# Patient Record
Sex: FEMALE | Race: WHITE | NOT HISPANIC OR LATINO
[De-identification: names, ages, dates, MRNs, and addresses within clinical notes are randomized per-mention and may not be internally consistent; named-entity substitution may affect disease eponyms.]

---

## 2023-03-03 ENCOUNTER — APPOINTMENT (OUTPATIENT)
Dept: ORTHOPEDIC SURGERY | Facility: CLINIC | Age: 63
End: 2023-03-03
Payer: COMMERCIAL

## 2023-03-03 VITALS — BODY MASS INDEX: 34.96 KG/M2 | HEIGHT: 62 IN | WEIGHT: 190 LBS

## 2023-03-03 DIAGNOSIS — I10 ESSENTIAL (PRIMARY) HYPERTENSION: ICD-10-CM

## 2023-03-03 DIAGNOSIS — E11.9 TYPE 2 DIABETES MELLITUS W/OUT COMPLICATIONS: ICD-10-CM

## 2023-03-03 PROBLEM — Z00.00 ENCOUNTER FOR PREVENTIVE HEALTH EXAMINATION: Status: ACTIVE | Noted: 2023-03-03

## 2023-03-03 PROCEDURE — 72110 X-RAY EXAM L-2 SPINE 4/>VWS: CPT

## 2023-03-03 PROCEDURE — 99204 OFFICE O/P NEW MOD 45 MIN: CPT | Mod: 25

## 2023-03-03 PROCEDURE — 20552 NJX 1/MLT TRIGGER POINT 1/2: CPT

## 2023-03-03 RX ORDER — METFORMIN HYDROCHLORIDE 625 MG/1
TABLET ORAL
Refills: 0 | Status: ACTIVE | COMMUNITY

## 2023-03-03 RX ORDER — CYCLOBENZAPRINE HYDROCHLORIDE 5 MG/1
5 TABLET, FILM COATED ORAL 3 TIMES DAILY
Qty: 30 | Refills: 0 | Status: ACTIVE | COMMUNITY
Start: 2023-03-03 | End: 1900-01-01

## 2023-03-03 RX ORDER — ATORVASTATIN CALCIUM 80 MG/1
TABLET, FILM COATED ORAL
Refills: 0 | Status: ACTIVE | COMMUNITY

## 2023-03-03 RX ORDER — LISINOPRIL 30 MG/1
TABLET ORAL
Refills: 0 | Status: ACTIVE | COMMUNITY

## 2023-03-03 RX ORDER — DULAGLUTIDE 4.5 MG/.5ML
INJECTION, SOLUTION SUBCUTANEOUS
Refills: 0 | Status: ACTIVE | COMMUNITY

## 2023-03-03 RX ORDER — OMEPRAZOLE 10 MG/1
10 CAPSULE, DELAYED RELEASE ORAL
Refills: 0 | Status: ACTIVE | COMMUNITY

## 2023-03-03 NOTE — PROCEDURE
[Trigger point 1-2 muscle groups] : trigger point 1-2 muscle groups [Bilateral] : bilaterally of the [Gluteus Jesu muscle] : gluteus jesu muscle [Pain] : pain [Alcohol] : alcohol [Betadine] : betadine [Ethyl Chloride sprayed topically] : ethyl chloride sprayed topically [Sterile technique used] : sterile technique used [___ cc    1%] : Lidocaine ~Vcc of 1%  [___ cc    40mg] : Triamcinolone (Kenalog) ~Vcc of 40 mg  [] : Patient tolerated procedure well [Risks, benefits, alternatives discussed / Verbal consent obtained] : the risks benefits, and alternatives have been discussed, and verbal consent was obtained

## 2023-03-03 NOTE — ASSESSMENT
[FreeTextEntry1] : 63 F with low back paina nd muscle spasm\par TPI today bilateral glutes\par nsaids\par I advised ALINE that the NSAID should be taken with food.  In addition while taking the prescribed NSAID, no over the counter or other NSAIDs should be used, such as ibuprofen (Motrin or Advil) or naproxen (Aleve) as this can cause stomach upset or other side effects.  If needed for fever or breakthrough pain Tylenol can be used.\par  We will also prescribe Muscle Relaxants as needed.  Discussed side effects including but not limited to drowsiness and dizziness.  Discussed patient should not drive after taking the medicine.\par FU 4 weeks\par PT \par If pain persists would consider MDP.  Not ideal given active UTI

## 2023-03-03 NOTE — IMAGING
[de-identified] : Spine:\par Inspection/Palpation:\par No tenderness to palpation throughout Cervical/thoracic/lumbar spine.  TTP ove rbilatearl buttocks \par No bony stepoffs, No lesions.\par \par Gait:\par antalgic, unable to perform bilateral toe and heel rise.  unAble to perform tandem gait.  \par \par Range of Motion:\par \par Neurologic:\par \par Bilateral Lower Extremities 5/5 Iliopsoas/Quadriceps/Hamstrings/ Tibialis Anterior/ Gastrocnemius. Extensor Hallucis Longus/ Flexor Hallucis Longus except \par \par \par Sensation intact to light touch L2-S1\par \par Patellar/ Achilles reflex within normal limits.\par \par \par No pain with IR/ER/Flexion of HIps bilaterally \par \par X-ray Ap/Lateral/Flexion/Extension of lumbar spine were viewed and interpreted.  L4-5 spondylolisthesis. No fractures .

## 2023-03-03 NOTE — HISTORY OF PRESENT ILLNESS
[Lower back] : lower back [Sudden] : sudden [10] : 10 [Burning] : burning [Shooting] : shooting [Constant] : constant [Household chores] : household chores [Leisure] : leisure [Meds] : meds [Nothing helps with pain getting better] : Nothing helps with pain getting better [de-identified] : 3/3/23 - This is a 64 y/o F who presents for lower back pain for four days with no specific injury. No n/t. No hx of sx to the lower back. Pt currently taking Tylenol, Cipro. \par \par Was initial thought to be kidney infection.  Had positive urinalysis.  [facility] did ultrasound no kidney infxn.\par \par NO pain raidating down legs. NO numbness ,weakness or tingling.  Burning sensation in back to bilateral flanks and radiating up spine. \par \par hx of previous back issues treated y total orthopedics ? hospitalization.  [] : Post Surgical Visit: no [FreeTextEntry5] : No injury [FreeTextEntry7] : up ti mid back [de-identified] : activity [de-identified] : N/A

## 2023-03-08 RX ORDER — METHYLPREDNISOLONE 4 MG/1
4 TABLET ORAL
Qty: 1 | Refills: 0 | Status: ACTIVE | COMMUNITY
Start: 2023-03-08 | End: 1900-01-01

## 2023-03-13 ENCOUNTER — NON-APPOINTMENT (OUTPATIENT)
Age: 63
End: 2023-03-13

## 2023-04-07 ENCOUNTER — NON-APPOINTMENT (OUTPATIENT)
Age: 63
End: 2023-04-07

## 2023-04-07 ENCOUNTER — APPOINTMENT (OUTPATIENT)
Dept: ORTHOPEDIC SURGERY | Facility: CLINIC | Age: 63
End: 2023-04-07
Payer: COMMERCIAL

## 2023-04-07 VITALS — BODY MASS INDEX: 34.96 KG/M2 | WEIGHT: 190 LBS | HEIGHT: 62 IN

## 2023-04-07 PROCEDURE — 20552 NJX 1/MLT TRIGGER POINT 1/2: CPT

## 2023-04-07 PROCEDURE — 99213 OFFICE O/P EST LOW 20 MIN: CPT | Mod: 25

## 2023-04-07 NOTE — PROCEDURE
[Trigger point 1-2 muscle groups] : trigger point 1-2 muscle groups [Left] : of the left [Gluteus Jesu muscle] : gluteus jesu muscle [Pain] : pain [Alcohol] : alcohol [Betadine] : betadine [Ethyl Chloride sprayed topically] : ethyl chloride sprayed topically [Sterile technique used] : sterile technique used [___ cc    1%] : Lidocaine ~Vcc of 1%  [___ cc    40mg] : Triamcinolone (Kenalog) ~Vcc of 40 mg  [] : Patient tolerated procedure well [Risks, benefits, alternatives discussed / Verbal consent obtained] : the risks benefits, and alternatives have been discussed, and verbal consent was obtained

## 2023-04-07 NOTE — HISTORY OF PRESENT ILLNESS
[Lower back] : lower back [Sudden] : sudden [Burning] : burning [Shooting] : shooting [Constant] : constant [Household chores] : household chores [Leisure] : leisure [Meds] : meds [Nothing helps with pain getting better] : Nothing helps with pain getting better [10] : 10 [de-identified] : 3/3/23 - This is a 64 y/o F who presents for lower back pain for four days with no specific injury. No n/t. No hx of sx to the lower back. Pt currently taking Tylenol, Cipro. \par \par Was initial thought to be kidney infection.  Had positive urinalysis.  [facility] did ultrasound no kidney infxn.\par \par NO pain raidating down legs. NO numbness ,weakness or tingling.  Burning sensation in back to bilateral flanks and radiating up spine. \par \par hx of previous back issues treated y total orthopedics ? hospitalization. \par \par 4/7/23 pain is improved but with persistent spasms on left to middle. No right sided symptoms nay longer. No pain radiating down legs. Has been gogin to PT and chiro still.  Started on new bladder medicaiton. [] : Post Surgical Visit: no [FreeTextEntry5] : No injury [FreeTextEntry7] : up ti mid back [de-identified] : activity [de-identified] : N/A

## 2023-04-07 NOTE — ASSESSMENT
[FreeTextEntry1] : 63 F with low back paina nd muscle spasm improved on right but still some residual pain on left. \par TPI today Left glute \par nsaids\par I advised ALINE that the NSAID should be taken with food.  In addition while taking the prescribed NSAID, no over the counter or other NSAIDs should be used, such as ibuprofen (Motrin or Advil) or naproxen (Aleve) as this can cause stomach upset or other side effects.  If needed for fever or breakthrough pain Tylenol can be used.\par  We will also prescribe Muscle Relaxants as needed.  Discussed side effects including but not limited to drowsiness and dizziness.  Discussed patient should not drive after taking the medicine.\par c/w PT\par MRI L spine \par FU after MRI \par C/w remote work until next visit\par Difficulty with commute secondary to increased pain when sitting for extended periods of time.

## 2023-04-07 NOTE — IMAGING
[de-identified] : Spine:\par Inspection/Palpation:\par No tenderness to palpation throughout Cervical/thoracic/lumbar spine.  TTP over L buttocks /PSIS \par No bony stepoffs, No lesions.\par \par Gait:\par antalgic, unable to perform bilateral toe and heel rise.  unAble to perform tandem gait.  \par \par Range of Motion:\par \par Neurologic:\par \par Bilateral Lower Extremities 5/5 Iliopsoas/Quadriceps/Hamstrings/ Tibialis Anterior/ Gastrocnemius. Extensor Hallucis Longus/ Flexor Hallucis Longus except \par \par \par Sensation intact to light touch L2-S1\par \par Patellar/ Achilles reflex within normal limits.\par \par \par No pain with IR/ER/Flexion of HIps bilaterally \par \par X-ray Ap/Lateral/Flexion/Extension of lumbar spine were viewed and interpreted.  L4-5 spondylolisthesis. No fractures .

## 2023-04-09 ENCOUNTER — FORM ENCOUNTER (OUTPATIENT)
Age: 63
End: 2023-04-09

## 2023-04-10 ENCOUNTER — APPOINTMENT (OUTPATIENT)
Dept: MRI IMAGING | Facility: CLINIC | Age: 63
End: 2023-04-10
Payer: COMMERCIAL

## 2023-04-10 PROCEDURE — 72148 MRI LUMBAR SPINE W/O DYE: CPT

## 2023-04-19 ENCOUNTER — NON-APPOINTMENT (OUTPATIENT)
Age: 63
End: 2023-04-19

## 2023-04-19 ENCOUNTER — APPOINTMENT (OUTPATIENT)
Dept: ORTHOPEDIC SURGERY | Facility: CLINIC | Age: 63
End: 2023-04-19
Payer: COMMERCIAL

## 2023-04-19 PROCEDURE — 99213 OFFICE O/P EST LOW 20 MIN: CPT

## 2023-04-19 NOTE — HISTORY OF PRESENT ILLNESS
[Lower back] : lower back [Sudden] : sudden [Constant] : constant [Household chores] : household chores [Leisure] : leisure [Meds] : meds [Nothing helps with pain getting better] : Nothing helps with pain getting better [7] : 7 [de-identified] : 3/3/23 - This is a 64 y/o F who presents for lower back pain for four days with no specific injury. No n/t. No hx of sx to the lower back. Pt currently taking Tylenol, Cipro. \par \par Was initial thought to be kidney infection.  Had positive urinalysis.  [facility] did ultrasound no kidney infxn.\par \par NO pain raidating down legs. NO numbness ,weakness or tingling.  Burning sensation in back to bilateral flanks and radiating up spine. \par \par hx of previous back issues treated y total orthopedics ? hospitalization. \par \par 4/7/23 pain is improved but with persistent spasms on left to middle. No right sided symptoms nay longer. No pain radiating down legs. Has been gogin to PT and chiro still.  Started on new bladder medicaiton.\par \par 4/19/23: pt reports improvement on left lower back with injection. continuing PT and remote work. Has some right sided pain now.  [] : Post Surgical Visit: no [FreeTextEntry5] : No injury [FreeTextEntry6] : pressure [FreeTextEntry7] : up to mid back [de-identified] : activity [de-identified] : MRI

## 2023-04-19 NOTE — ASSESSMENT
[FreeTextEntry1] : 63 F with low back pain and muscle spasm worse on right. \par nsaids\par I advised ALINE that the NSAID should be taken with food.  In addition while taking the prescribed NSAID, no over the counter or other NSAIDs should be used, such as ibuprofen (Motrin or Advil) or naproxen (Aleve) as this can cause stomach upset or other side effects.  If needed for fever or breakthrough pain Tylenol can be used.\par  We will also prescribe Muscle Relaxants as needed.  Discussed side effects including but not limited to drowsiness and dizziness.  Discussed patient should not drive after taking the medicine.\par c/w PT\par C/w remote work until next visit\par Difficulty with commute secondary to increased pain when sitting for extended periods of time. \par \par Pain management referral for possible facet injection vs MBBB

## 2023-04-19 NOTE — IMAGING
[de-identified] : Spine:\par Inspection/Palpation:\par No tenderness to palpation throughout Cervical/thoracic/lumbar spine.  \par No bony stepoffs, No lesions.\par \par Gait:\par antalgic, unable to perform bilateral toe and heel rise.  unAble to perform tandem gait.  \par \par Range of Motion:\par \par Neurologic:\par \par Bilateral Lower Extremities 5/5 Iliopsoas/Quadriceps/Hamstrings/ Tibialis Anterior/ Gastrocnemius. Extensor Hallucis Longus/ Flexor Hallucis Longus except \par \par \par Sensation intact to light touch L2-S1\par \par Patellar/ Achilles reflex within normal limits.\par \par \par No pain with IR/ER/Flexion of HIps bilaterally \par \par X-ray Ap/Lateral/Flexion/Extension of lumbar spine were viewed and interpreted.  L4-5 spondylolisthesis. No fractures .\par \par MRI Lumbar spine reviewed and interpreted independently.  Agree with report as follows. \par \par 1. Multilevel loss of disc signal and height with multilevel Schmorl's nodes and Modic type 2 endplate change \par at anterior superior L2 and L3.\par 2. T12-L1: Bulge.\par 3. L1-L2: Minor retrolisthesis. Bulge, facet hypertrophy, and ligamentum flavum hypertrophy. Broad central \par herniation.\par 4. L2-L3: Facet hypertrophy, ligamentum flavum hypertrophy, and facet effusion.\par 5. L3-L4: Facet hypertrophy, ligamentum flavum hypertrophy, and right facet effusion.\par 6. L4-L5: Grade 1 anterior spondylolisthesis. Broad bulge, facet hypertrophy, and ligamentum flavum \par hypertrophy with left facet effusion. 5 mm synovial cyst contiguous with the posterior right facet joint. Adjacent \par diffuse soft tissue edema on the right and left. Mild central stenosis.\par 7. L5-S1: Facet hypertrophy and ligamentum flavum hypertrophy with left facet effusion and soft tissue edema.

## 2023-04-27 ENCOUNTER — APPOINTMENT (OUTPATIENT)
Dept: PAIN MANAGEMENT | Facility: CLINIC | Age: 63
End: 2023-04-27
Payer: COMMERCIAL

## 2023-04-27 VITALS — BODY MASS INDEX: 34.96 KG/M2 | HEIGHT: 62 IN | WEIGHT: 190 LBS

## 2023-04-27 DIAGNOSIS — M79.10 MYALGIA, UNSPECIFIED SITE: ICD-10-CM

## 2023-04-27 PROCEDURE — 99204 OFFICE O/P NEW MOD 45 MIN: CPT | Mod: 25

## 2023-04-27 PROCEDURE — 20552 NJX 1/MLT TRIGGER POINT 1/2: CPT

## 2023-04-27 PROCEDURE — J3490M: CUSTOM

## 2023-04-27 RX ORDER — METHOCARBAMOL 750 MG/1
750 TABLET, FILM COATED ORAL TWICE DAILY
Qty: 60 | Refills: 0 | Status: ACTIVE | COMMUNITY
Start: 2023-04-27 | End: 1900-01-01

## 2023-04-27 NOTE — HISTORY OF PRESENT ILLNESS
[Lower back] : lower back [0] : 0 [Household chores] : household chores [FreeTextEntry1] : Initial HPI 04/27/23: \par Pain started March 2023 and is on the BILATERAL lower back described as a pressure/spasm pain. No radiation down the legs. Saw Dr. Partha Curiel who recommended facet injections/MBBs. Pain has been getting better. \par \par MRI Lumbar Spine 4/10/23 independently reviewed: L4-5 spondy, mild central stenosis, right facet cyst \par Conservative Care: Has been doing PT and chiropractor which is helping a lot. \par Pain Medications: Mobic PRN \par Past Injections: TPIs \par Spine surgery: none \par Blood thinners: none\par  [] : This patient has had an injection before: no [FreeTextEntry6] : spasms  [FreeTextEntry9] : lidocaine shots  [de-identified] : L MRI  - - -

## 2023-04-27 NOTE — PHYSICAL EXAM
[de-identified] : Constitutional; Appears well, no apparent distress\par Ability to communicate: Normal \par Respiratory: non-labored breathing\par Skin: No rash noted\par Head: Normocephalic, atraumatic\par Neck: no visible thyroid enlargement\par Eyes: Extraocular movements intact\par Neurologic: Alert and oriented x3\par Psychiatric: normal mood, affect and behavior [] : non-antalgic

## 2023-04-27 NOTE — DISCUSSION/SUMMARY
[de-identified] : After discussing various treatment options with the patient including but not limited to oral medications, physical therapy, exercise modalities as well as interventional spinal injections, we have decided with the following plan:\par \par - Continue home exercises, stretching, activity modification, physical therapy, and conservative care.\par - MRI report and/or images was reviewed and discussed with the patient.\par - Follow-up as needed.\par - Can consider Lumbar MBBs if pain worsens or possible cyst rupture. \par - Will prescribe Methocarbamol (Robaxin) 750mg TID PRN for muscle spasms and to assist with pain relief.\par

## 2023-05-24 ENCOUNTER — NON-APPOINTMENT (OUTPATIENT)
Age: 63
End: 2023-05-24

## 2023-05-24 ENCOUNTER — APPOINTMENT (OUTPATIENT)
Dept: ORTHOPEDIC SURGERY | Facility: CLINIC | Age: 63
End: 2023-05-24
Payer: COMMERCIAL

## 2023-05-24 VITALS — HEIGHT: 62 IN | BODY MASS INDEX: 34.04 KG/M2 | WEIGHT: 185 LBS

## 2023-05-24 VITALS — WEIGHT: 190 LBS | BODY MASS INDEX: 34.96 KG/M2 | HEIGHT: 62 IN

## 2023-05-24 DIAGNOSIS — M71.38 OTHER BURSAL CYST, OTHER SITE: ICD-10-CM

## 2023-05-24 DIAGNOSIS — M43.16 SPONDYLOLISTHESIS, LUMBAR REGION: ICD-10-CM

## 2023-05-24 DIAGNOSIS — M62.838 OTHER MUSCLE SPASM: ICD-10-CM

## 2023-05-24 PROCEDURE — 99213 OFFICE O/P EST LOW 20 MIN: CPT

## 2023-05-24 NOTE — HISTORY OF PRESENT ILLNESS
[Lower back] : lower back [Sudden] : sudden [7] : 7 [Household chores] : household chores [Leisure] : leisure [Meds] : meds [Nothing helps with pain getting better] : Nothing helps with pain getting better [Occasional] : occasional [de-identified] : 5/24/23: continuing PT, NSAIDs. [] : Post Surgical Visit: no [FreeTextEntry5] : No injury, reports that she has no pain. pain has been subsiding after PT [FreeTextEntry6] : slight soreness [FreeTextEntry7] : up to mid back [de-identified] : activity

## 2023-05-24 NOTE — IMAGING
[de-identified] : Spine:\par Inspection/Palpation:\par No tenderness to palpation throughout Cervical/thoracic/lumbar spine.  \par No bony stepoffs, No lesions.\par \par Gait:\par antalgic, unable to perform bilateral toe and heel rise.  unAble to perform tandem gait.  \par \par Range of Motion:\par \par Neurologic:\par \par Bilateral Lower Extremities 5/5 Iliopsoas/Quadriceps/Hamstrings/ Tibialis Anterior/ Gastrocnemius. Extensor Hallucis Longus/ Flexor Hallucis Longus except \par \par \par Sensation intact to light touch L2-S1\par \par Patellar/ Achilles reflex within normal limits.\par \par \par No pain with IR/ER/Flexion of HIps bilaterally \par \par X-ray Ap/Lateral/Flexion/Extension of lumbar spine were viewed and interpreted.  L4-5 spondylolisthesis. No fractures .\par \par MRI Lumbar spine reviewed and interpreted independently.  Agree with report as follows. \par \par 1. Multilevel loss of disc signal and height with multilevel Schmorl's nodes and Modic type 2 endplate change \par at anterior superior L2 and L3.\par 2. T12-L1: Bulge.\par 3. L1-L2: Minor retrolisthesis. Bulge, facet hypertrophy, and ligamentum flavum hypertrophy. Broad central \par herniation.\par 4. L2-L3: Facet hypertrophy, ligamentum flavum hypertrophy, and facet effusion.\par 5. L3-L4: Facet hypertrophy, ligamentum flavum hypertrophy, and right facet effusion.\par 6. L4-L5: Grade 1 anterior spondylolisthesis. Broad bulge, facet hypertrophy, and ligamentum flavum \par hypertrophy with left facet effusion. 5 mm synovial cyst contiguous with the posterior right facet joint. Adjacent \par diffuse soft tissue edema on the right and left. Mild central stenosis.\par 7. L5-S1: Facet hypertrophy and ligamentum flavum hypertrophy with left facet effusion and soft tissue edema.

## 2023-05-24 NOTE — HISTORY OF PRESENT ILLNESS
[Lower back] : lower back [Sudden] : sudden [7] : 7 [Household chores] : household chores [Leisure] : leisure [Meds] : meds [Nothing helps with pain getting better] : Nothing helps with pain getting better [Occasional] : occasional [de-identified] : 5/24/23: continuing PT, NSAIDs. [] : Post Surgical Visit: no [FreeTextEntry5] : No injury, reports that she has no pain. pain has been subsiding after PT [FreeTextEntry6] : slight soreness [FreeTextEntry7] : up to mid back [de-identified] : activity

## 2023-05-24 NOTE — IMAGING
[de-identified] : Spine:\par Inspection/Palpation:\par No tenderness to palpation throughout Cervical/thoracic/lumbar spine.  \par No bony stepoffs, No lesions.\par \par Gait:\par antalgic, unable to perform bilateral toe and heel rise.  unAble to perform tandem gait.  \par \par Range of Motion:\par \par Neurologic:\par \par Bilateral Lower Extremities 5/5 Iliopsoas/Quadriceps/Hamstrings/ Tibialis Anterior/ Gastrocnemius. Extensor Hallucis Longus/ Flexor Hallucis Longus except \par \par \par Sensation intact to light touch L2-S1\par \par Patellar/ Achilles reflex within normal limits.\par \par \par No pain with IR/ER/Flexion of HIps bilaterally \par \par X-ray Ap/Lateral/Flexion/Extension of lumbar spine were viewed and interpreted.  L4-5 spondylolisthesis. No fractures .\par \par MRI Lumbar spine reviewed and interpreted independently.  Agree with report as follows. \par \par 1. Multilevel loss of disc signal and height with multilevel Schmorl's nodes and Modic type 2 endplate change \par at anterior superior L2 and L3.\par 2. T12-L1: Bulge.\par 3. L1-L2: Minor retrolisthesis. Bulge, facet hypertrophy, and ligamentum flavum hypertrophy. Broad central \par herniation.\par 4. L2-L3: Facet hypertrophy, ligamentum flavum hypertrophy, and facet effusion.\par 5. L3-L4: Facet hypertrophy, ligamentum flavum hypertrophy, and right facet effusion.\par 6. L4-L5: Grade 1 anterior spondylolisthesis. Broad bulge, facet hypertrophy, and ligamentum flavum \par hypertrophy with left facet effusion. 5 mm synovial cyst contiguous with the posterior right facet joint. Adjacent \par diffuse soft tissue edema on the right and left. Mild central stenosis.\par 7. L5-S1: Facet hypertrophy and ligamentum flavum hypertrophy with left facet effusion and soft tissue edema.

## 2023-05-24 NOTE — HISTORY OF PRESENT ILLNESS
[Lower back] : lower back [Sudden] : sudden [7] : 7 [Household chores] : household chores [Leisure] : leisure [Meds] : meds [Nothing helps with pain getting better] : Nothing helps with pain getting better [Occasional] : occasional [de-identified] : 5/24/23: continuing PT, NSAIDs. [] : Post Surgical Visit: no [FreeTextEntry5] : No injury, reports that she has no pain. pain has been subsiding after PT [FreeTextEntry6] : slight soreness [FreeTextEntry7] : up to mid back [de-identified] : activity

## 2023-05-24 NOTE — IMAGING
[de-identified] : Spine:\par Inspection/Palpation:\par No tenderness to palpation throughout Cervical/thoracic/lumbar spine.  \par No bony stepoffs, No lesions.\par \par Gait:\par antalgic, unable to perform bilateral toe and heel rise.  unAble to perform tandem gait.  \par \par Range of Motion:\par \par Neurologic:\par \par Bilateral Lower Extremities 5/5 Iliopsoas/Quadriceps/Hamstrings/ Tibialis Anterior/ Gastrocnemius. Extensor Hallucis Longus/ Flexor Hallucis Longus except \par \par \par Sensation intact to light touch L2-S1\par \par Patellar/ Achilles reflex within normal limits.\par \par \par No pain with IR/ER/Flexion of HIps bilaterally \par \par X-ray Ap/Lateral/Flexion/Extension of lumbar spine were viewed and interpreted.  L4-5 spondylolisthesis. No fractures .\par \par MRI Lumbar spine reviewed and interpreted independently.  Agree with report as follows. \par \par 1. Multilevel loss of disc signal and height with multilevel Schmorl's nodes and Modic type 2 endplate change \par at anterior superior L2 and L3.\par 2. T12-L1: Bulge.\par 3. L1-L2: Minor retrolisthesis. Bulge, facet hypertrophy, and ligamentum flavum hypertrophy. Broad central \par herniation.\par 4. L2-L3: Facet hypertrophy, ligamentum flavum hypertrophy, and facet effusion.\par 5. L3-L4: Facet hypertrophy, ligamentum flavum hypertrophy, and right facet effusion.\par 6. L4-L5: Grade 1 anterior spondylolisthesis. Broad bulge, facet hypertrophy, and ligamentum flavum \par hypertrophy with left facet effusion. 5 mm synovial cyst contiguous with the posterior right facet joint. Adjacent \par diffuse soft tissue edema on the right and left. Mild central stenosis.\par 7. L5-S1: Facet hypertrophy and ligamentum flavum hypertrophy with left facet effusion and soft tissue edema.

## 2023-06-01 ENCOUNTER — RX RENEWAL (OUTPATIENT)
Age: 63
End: 2023-06-01

## 2023-09-05 ENCOUNTER — RX RENEWAL (OUTPATIENT)
Age: 63
End: 2023-09-05

## 2023-12-06 ENCOUNTER — RX RENEWAL (OUTPATIENT)
Age: 63
End: 2023-12-06

## 2023-12-06 RX ORDER — MELOXICAM 15 MG/1
15 TABLET ORAL DAILY
Qty: 30 | Refills: 2 | Status: ACTIVE | COMMUNITY
Start: 2023-03-03 | End: 1900-01-01

## 2024-02-04 ENCOUNTER — APPOINTMENT (OUTPATIENT)
Dept: ORTHOPEDIC SURGERY | Facility: CLINIC | Age: 64
End: 2024-02-04
Payer: COMMERCIAL

## 2024-02-04 VITALS — BODY MASS INDEX: 31.47 KG/M2 | WEIGHT: 171 LBS | HEIGHT: 62 IN

## 2024-02-04 DIAGNOSIS — M70.62 TROCHANTERIC BURSITIS, LEFT HIP: ICD-10-CM

## 2024-02-04 DIAGNOSIS — M16.12 UNILATERAL PRIMARY OSTEOARTHRITIS, LEFT HIP: ICD-10-CM

## 2024-02-04 PROCEDURE — 73502 X-RAY EXAM HIP UNI 2-3 VIEWS: CPT

## 2024-02-04 PROCEDURE — 99203 OFFICE O/P NEW LOW 30 MIN: CPT

## 2024-02-04 RX ORDER — METHYLPREDNISOLONE 4 MG/1
4 TABLET ORAL
Qty: 1 | Refills: 1 | Status: ACTIVE | COMMUNITY
Start: 2024-02-04 | End: 1900-01-01

## 2024-02-04 RX ORDER — VIBEGRON 75 MG/1
TABLET, FILM COATED ORAL
Refills: 0 | Status: ACTIVE | COMMUNITY

## 2024-02-04 RX ORDER — SEMAGLUTIDE 2.68 MG/ML
8 INJECTION, SOLUTION SUBCUTANEOUS
Refills: 0 | Status: ACTIVE | COMMUNITY

## 2024-02-04 NOTE — HISTORY OF PRESENT ILLNESS
[Left Leg] : left leg [6] : 6 [Sudden] : sudden [3] : 3 [Dull/Aching] : dull/aching [Constant] : constant [Meds] : meds [de-identified] : 2/4/24: pt is a 62 y/o female with left hip and groin pain. pt states no specific injury. pain began about 1.5 months ago. pt c/o that pain has worsened. no hx of hip problems. pt has gone to chiropractor and was told that it is probably her hip. pt says that she takes meloxicam. pain is constant but worse during activity.  the pain is a dull ache. pain in the groin and lateral.  [] : no [FreeTextEntry1] : hip/groin  [de-identified] : motion  [de-identified] : none

## 2024-02-04 NOTE — ASSESSMENT
[FreeTextEntry1] : 62 y/o female with mild djd in left hip and bursitis. options discussed including but not limited to rest/ice/nsaids(if able to take with pmhx)/IA cortisone/viscosupplementation/therapy/mri/arthroscopy/ TJR.  she'd like to try therapt and medrol and if no better will go for an ir guided hip injection. risks/benefits/complications discussed from injection including but not limited to bleeding/infection.

## 2024-02-04 NOTE — PHYSICAL EXAM
[] : no pain with log-rolling of hip [5___] : adduction 5[unfilled]/5 [Left] : left hip with pelvis [AP] : anteroposterior [Lateral] : lateral [Mild arthritis (Tonnis Grade 1)] : Mild arthritis (Tonnis Grade 1) [TWNoteComboBox7] : flexion 110 degrees [de-identified] : external rotation 40 degrees [TWNoteComboBox6] : internal rotation 30 degrees